# Patient Record
Sex: MALE | Race: BLACK OR AFRICAN AMERICAN | NOT HISPANIC OR LATINO
[De-identification: names, ages, dates, MRNs, and addresses within clinical notes are randomized per-mention and may not be internally consistent; named-entity substitution may affect disease eponyms.]

---

## 2017-01-03 ENCOUNTER — RX ONLY (OUTPATIENT)
Age: 77
Setting detail: RX ONLY
End: 2017-01-03

## 2017-01-09 ENCOUNTER — RX ONLY (OUTPATIENT)
Age: 77
Setting detail: RX ONLY
End: 2017-01-09

## 2017-01-19 ENCOUNTER — RX ONLY (OUTPATIENT)
Age: 77
Setting detail: RX ONLY
End: 2017-01-19

## 2017-01-20 ENCOUNTER — RX ONLY (OUTPATIENT)
Age: 77
Setting detail: RX ONLY
End: 2017-01-20

## 2017-01-23 ENCOUNTER — RX ONLY (OUTPATIENT)
Age: 77
Setting detail: RX ONLY
End: 2017-01-23

## 2017-03-22 ENCOUNTER — RX ONLY (OUTPATIENT)
Age: 77
Setting detail: RX ONLY
End: 2017-03-22

## 2018-08-01 ENCOUNTER — HOSPITAL ENCOUNTER (OUTPATIENT)
Dept: HOSPITAL 5 - NM | Age: 78
Discharge: HOME | End: 2018-08-01
Attending: INTERNAL MEDICINE
Payer: MEDICARE

## 2018-08-01 DIAGNOSIS — I10: ICD-10-CM

## 2018-08-01 DIAGNOSIS — Z91.018: ICD-10-CM

## 2018-08-01 DIAGNOSIS — R06.02: Primary | ICD-10-CM

## 2018-08-01 DIAGNOSIS — Z88.6: ICD-10-CM

## 2018-08-01 DIAGNOSIS — Z88.8: ICD-10-CM

## 2018-08-01 DIAGNOSIS — K21.9: ICD-10-CM

## 2018-08-01 DIAGNOSIS — J44.9: ICD-10-CM

## 2018-08-01 DIAGNOSIS — R79.1: ICD-10-CM

## 2018-08-01 LAB
ALBUMIN SERPL-MCNC: 4.1 G/DL (ref 3.9–5)
ALT SERPL-CCNC: 11 UNITS/L (ref 7–56)
BUN SERPL-MCNC: 21 MG/DL (ref 9–20)
BUN/CREAT SERPL: 16 %
CALCIUM SERPL-MCNC: 9.6 MG/DL (ref 8.4–10.2)
HCT VFR BLD CALC: 35.3 % (ref 35.5–45.6)
HEMOLYSIS INDEX: 6
HGB BLD-MCNC: 11.6 GM/DL (ref 11.8–15.2)
MCH RBC QN AUTO: 29 PG (ref 28–32)
MCHC RBC AUTO-ENTMCNC: 33 % (ref 32–34)
MCV RBC AUTO: 87 FL (ref 84–94)
PLATELET # BLD: 202 K/MM3 (ref 140–440)
RBC # BLD AUTO: 4.05 M/MM3 (ref 3.65–5.03)

## 2018-08-01 PROCEDURE — 71046 X-RAY EXAM CHEST 2 VIEWS: CPT

## 2018-08-01 PROCEDURE — 93970 EXTREMITY STUDY: CPT

## 2018-08-01 PROCEDURE — A9558 XE133 XENON 10MCI: HCPCS

## 2018-08-01 PROCEDURE — 78582 LUNG VENTILAT&PERFUS IMAGING: CPT

## 2018-08-01 PROCEDURE — 85027 COMPLETE CBC AUTOMATED: CPT

## 2018-08-01 PROCEDURE — 36415 COLL VENOUS BLD VENIPUNCTURE: CPT

## 2018-08-01 PROCEDURE — 80053 COMPREHEN METABOLIC PANEL: CPT

## 2018-08-01 PROCEDURE — A9540 TC99M MAA: HCPCS

## 2018-08-01 NOTE — NUCLEAR MEDICINE REPORT
LUNG SCAN, VENTILATION AND PERFUSION:



History: Shortness of breath, abnormal coagulation profile.



Technique: 5mci of Tc99m MAA was infused for the perfusion images.  

15mci  gas was inhaled for the ventilatory images.



Correlation is made with a chest x-ray dated 8/1/18.



Findings:

Inhalation of Xenon gas demonstrates a normal distribution of the

activity throughout both lungs.  The wash out phases show mild 

retention of the radiotracer suggesting mild COPD.



After injection of Technetium 99m macroaggregated albumin gamma

camera imaging of the lungs in multiple projections demonstrates

normal pulmonary contours with a homogeneous distribution of activity. 

No focal areas of perfusion deficiency are identified.



IMPRESSION:

Low probability for pulmonary embolus.

## 2018-08-01 NOTE — XRAY REPORT
CHEST XRAY, 2 VIEWS:



History: Pulmonary embolism.



Findings:



There is mild diffuse interstitial coarsening.  The lungs are 

hyperexpanded but clear.  No infiltrate, pleural fluid or pneumothorax 

is detected.  The cardiac silhouette and pulmonary vasculature are 

within normal limits for technique. The bony thorax is unremarkable.



IMPRESSION:

Changes consistent with mild COPD.  No acute cardiopulmonary process.

## 2018-08-06 NOTE — VASCULAR LAB REPORT
LOWER EXTREMITY VENOUS DUPLEX:



REASON FOR EXAM: Short of breath.



COMMENTS ON THE RIGHT:

All veins visualized are freely compressible without evidence of

internal echogenicity.  Flow is spontaneous and phasic throughout.



COMMENTS ON THE LEFT:

All veins visualized are freely compressible without evidence of

internal echogenicity.  Flow is spontaneous and phasic throughout.



IMPRESSION:

No evidence of acute or chronic deep venous thrombosis in either

lower extremity.

## 2019-06-25 ENCOUNTER — APPOINTMENT (RX ONLY)
Dept: URBAN - METROPOLITAN AREA CLINIC 51 | Facility: CLINIC | Age: 79
Setting detail: DERMATOLOGY
End: 2019-06-25

## 2019-06-25 ENCOUNTER — APPOINTMENT (RX ONLY)
Dept: URBAN - METROPOLITAN AREA CLINIC 52 | Facility: CLINIC | Age: 79
Setting detail: DERMATOLOGY
End: 2019-06-25

## 2019-06-25 DIAGNOSIS — L28.0 LICHEN SIMPLEX CHRONICUS: ICD-10-CM

## 2019-06-25 DIAGNOSIS — B35.6 TINEA CRURIS: ICD-10-CM

## 2019-06-25 DIAGNOSIS — L85.3 XEROSIS CUTIS: ICD-10-CM

## 2019-06-25 PROBLEM — F32.9 MAJOR DEPRESSIVE DISORDER, SINGLE EPISODE, UNSPECIFIED: Status: ACTIVE | Noted: 2019-06-25

## 2019-06-25 PROBLEM — I10 ESSENTIAL (PRIMARY) HYPERTENSION: Status: ACTIVE | Noted: 2019-06-25

## 2019-06-25 PROBLEM — K21.9 GASTRO-ESOPHAGEAL REFLUX DISEASE WITHOUT ESOPHAGITIS: Status: ACTIVE | Noted: 2019-06-25

## 2019-06-25 PROBLEM — J45.909 UNSPECIFIED ASTHMA, UNCOMPLICATED: Status: ACTIVE | Noted: 2019-06-25

## 2019-06-25 PROBLEM — F41.9 ANXIETY DISORDER, UNSPECIFIED: Status: ACTIVE | Noted: 2019-06-25

## 2019-06-25 PROCEDURE — ? PRESCRIPTION

## 2019-06-25 PROCEDURE — ? COUNSELING

## 2019-06-25 PROCEDURE — ? INTRALESIONAL KENALOG

## 2019-06-25 PROCEDURE — 99213 OFFICE O/P EST LOW 20 MIN: CPT | Mod: 25

## 2019-06-25 PROCEDURE — 11901 INJECT SKIN LESIONS >7: CPT

## 2019-06-25 RX ORDER — CLOTRIMAZOLE/BETAMETHASONE DIP 1 %-0.05 %
1 CREAM (GRAM) TOPICAL BID
Qty: 45 | Refills: 1 | Status: ERX | COMMUNITY
Start: 2019-06-25

## 2019-06-25 RX ADMIN — Medication 1: at 20:23

## 2019-06-25 ASSESSMENT — LOCATION DETAILED DESCRIPTION DERM
LOCATION DETAILED: RIGHT INGUINAL CREASE
LOCATION DETAILED: LEFT ANTERIOR PROXIMAL THIGH
LOCATION DETAILED: LEFT BUTTOCK
LOCATION DETAILED: LEFT ANTERIOR PROXIMAL THIGH
LOCATION DETAILED: LEFT BUTTOCK
LOCATION DETAILED: RIGHT BUTTOCK
LOCATION DETAILED: RIGHT INGUINAL CREASE
LOCATION DETAILED: RIGHT BUTTOCK

## 2019-06-25 ASSESSMENT — LOCATION SIMPLE DESCRIPTION DERM
LOCATION SIMPLE: LEFT THIGH
LOCATION SIMPLE: GROIN
LOCATION SIMPLE: RIGHT BUTTOCK
LOCATION SIMPLE: GROIN
LOCATION SIMPLE: LEFT BUTTOCK
LOCATION SIMPLE: LEFT THIGH
LOCATION SIMPLE: LEFT BUTTOCK
LOCATION SIMPLE: RIGHT BUTTOCK

## 2019-06-25 ASSESSMENT — LOCATION ZONE DERM
LOCATION ZONE: TRUNK
LOCATION ZONE: LEG
LOCATION ZONE: LEG
LOCATION ZONE: TRUNK

## 2019-06-25 NOTE — HPI: RASH
How Severe Is Your Rash?: moderate
Is This A New Presentation, Or A Follow-Up?: Follow Up Rash
Additional History: Pt has been given Ketoconazole cream in the past

## 2019-06-25 NOTE — PROCEDURE: INTRALESIONAL KENALOG
Kenalog Preparation: Kenalog
X Size Of Lesion In Cm (Optional): 0
Include Z78.9 (Other Specified Conditions Influencing Health Status) As An Associated Diagnosis?: No
Consent: The risks of atrophy were reviewed with the patient.
Concentration Of Kenalog Solution Injected (Mg/Ml): 5.0
Total Volume (Ccs): 2.5
Medical Necessity Clause: This procedure was medically necessary because the lesions that were treated were:
Detail Level: Detailed

## 2019-06-25 NOTE — PROCEDURE: INTRALESIONAL KENALOG
Consent: The risks of atrophy were reviewed with the patient.
Concentration Of Kenalog Solution Injected (Mg/Ml): 5.0
X Size Of Lesion In Cm (Optional): 0
Include Z78.9 (Other Specified Conditions Influencing Health Status) As An Associated Diagnosis?: No
Total Volume (Ccs): 2.5
Medical Necessity Clause: This procedure was medically necessary because the lesions that were treated were:
Detail Level: Detailed
Kenalog Preparation: Kenalog

## 2019-07-12 ENCOUNTER — HOSPITAL ENCOUNTER (OUTPATIENT)
Dept: HOSPITAL 5 - GIO | Age: 79
Discharge: HOME | End: 2019-07-12
Attending: INTERNAL MEDICINE
Payer: MEDICARE

## 2019-07-12 VITALS — SYSTOLIC BLOOD PRESSURE: 113 MMHG | DIASTOLIC BLOOD PRESSURE: 60 MMHG

## 2019-07-12 DIAGNOSIS — Z91.018: ICD-10-CM

## 2019-07-12 DIAGNOSIS — J44.9: ICD-10-CM

## 2019-07-12 DIAGNOSIS — Z79.899: ICD-10-CM

## 2019-07-12 DIAGNOSIS — I10: ICD-10-CM

## 2019-07-12 DIAGNOSIS — Z88.8: ICD-10-CM

## 2019-07-12 DIAGNOSIS — Z88.6: ICD-10-CM

## 2019-07-12 DIAGNOSIS — Z87.440: ICD-10-CM

## 2019-07-12 DIAGNOSIS — K29.70: ICD-10-CM

## 2019-07-12 DIAGNOSIS — Z87.442: ICD-10-CM

## 2019-07-12 DIAGNOSIS — K21.0: Primary | ICD-10-CM

## 2019-07-12 DIAGNOSIS — K22.8: ICD-10-CM

## 2019-07-12 DIAGNOSIS — Z86.010: ICD-10-CM

## 2019-07-12 PROCEDURE — 88305 TISSUE EXAM BY PATHOLOGIST: CPT

## 2019-07-12 PROCEDURE — 88342 IMHCHEM/IMCYTCHM 1ST ANTB: CPT

## 2019-07-12 PROCEDURE — 43239 EGD BIOPSY SINGLE/MULTIPLE: CPT

## 2019-07-12 PROCEDURE — 88312 SPECIAL STAINS GROUP 1: CPT

## 2019-07-12 NOTE — ANESTHESIA CONSULTATION
Anesthesia Consult and Med Hx


Date of service: 07/12/19





- Airway


Anesthetic Teeth Evaluation: Good, Dentures


ROM Head & Neck: Adequate


Mental/Hyoid Distance: Adequate


Mallampati Class: Class II


Intubation Access Assessment: Probably Good





- Pre-Operative Health Status


ASA Pre-Surgery Classification: ASA2


Proposed Anesthetic Plan: MAC





- Pulmonary


COPD: Yes





- Cardiovascular System


Hx Hypertension: Yes





- Gastrointestinal


Hx Gastroesophageal Reflux Disease: Yes (Erosive esophagitis)





- Endocrine


Hx Renal Disease: Yes

## 2019-07-12 NOTE — PROCEDURE NOTE
Date of procedure: 07/12/19


Pre-op diagnosis: GERD


Post-op diagnosis: other (Moderately,Severe Erosive Esophagitis/ Mild to 

Moderate Candida Esophagitis/Gastritis)


Anesthesia: MAC


Surgeon: LORA WALLIS


Estimated blood loss: minimal


Pathology: list


Specimen disposition: to lab


Condition: stable


Disposition: same day (Treat with PPI if ok with the patient's nephrologist.  

Avoid aspirin and NSAID and anticoagulants for 5 days and follow up in 1 to 2 

weeks ([phone]).)

## 2019-07-12 NOTE — OPERATIVE REPORT
PROCEDURE PERFORMED:  Esophagogastroduodenoscopy with biopsy.



INDICATIONS:  A 79-year-old -American gentleman with a prior history of

colon polyps and diverticular disease.  Last colonoscopy was done in 2016. 

Lately, he has been having problems with GERD symptoms.  EGD was done to assess

for erosive esophagitis.  He has a history of renal insufficiency and is unable

to take PPIs.



DESCRIPTION OF PROCEDURE:  The procedure was done at [facility] after getting informed consent with MAC anesthesia.  Instrument was

passed through the hypopharynx into the esophagus, which did show presence of

moderate distal erosive esophagitis with possible Krause's esophagus as well as

moderate Candida esophagitis involving the mid esophagus.  Biopsy was done from

the mid esophagus as well as from the distal esophagus to assess for the

severity and presence of Candida esophagitis and the severity of the erosive

esophagitis.  Stomach showed gastritis.  Biopsy was done from the gastric body,

angle incisura and the gastric antrum to rule out for H. pylori and atrophic

gastritis.  The pylorus is patent.  The duodenum in the first and second portion

appeared normal.



ASSESSMENT:  Gastroesophageal reflux disease symptoms, moderate erosive

esophagitis, rule out Krause's esophagus, mild to moderate Candida esophagitis,

gastritis.



PLAN:  To treat the patient with fluconazole if the patient's renal doctor

permits, also to place the patient on PPI and avoid aspirin and aspirin-related

products for the next few days.  If the patient is positive for H. pylori, the

patient will be appropriately treated.  The patient will be asked to follow up

in the office in 1-2 weeks' time.



The procedure was done in the GI lab in the presence and with the assistance of

RN, Shala James and Olga Lidia gaspar and also with the assistance of anesthesia.





DD: 07/12/2019 13:45

DT: 07/12/2019 14:02

JOB# [id number]  [id number]

FELIZ/BRONWYN

## 2019-07-16 ENCOUNTER — APPOINTMENT (RX ONLY)
Dept: URBAN - METROPOLITAN AREA CLINIC 52 | Facility: CLINIC | Age: 79
Setting detail: DERMATOLOGY
End: 2019-07-16

## 2019-07-16 ENCOUNTER — APPOINTMENT (RX ONLY)
Dept: URBAN - METROPOLITAN AREA CLINIC 51 | Facility: CLINIC | Age: 79
Setting detail: DERMATOLOGY
End: 2019-07-16

## 2019-07-16 DIAGNOSIS — L85.3 XEROSIS CUTIS: ICD-10-CM | Status: IMPROVED

## 2019-07-16 DIAGNOSIS — B35.6 TINEA CRURIS: ICD-10-CM | Status: IMPROVED

## 2019-07-16 DIAGNOSIS — L28.0 LICHEN SIMPLEX CHRONICUS: ICD-10-CM | Status: IMPROVED

## 2019-07-16 PROBLEM — M12.9 ARTHROPATHY, UNSPECIFIED: Status: ACTIVE | Noted: 2019-07-16

## 2019-07-16 PROBLEM — F41.9 ANXIETY DISORDER, UNSPECIFIED: Status: ACTIVE | Noted: 2019-07-16

## 2019-07-16 PROBLEM — H91.90 UNSPECIFIED HEARING LOSS, UNSPECIFIED EAR: Status: ACTIVE | Noted: 2019-07-16

## 2019-07-16 PROBLEM — F32.9 MAJOR DEPRESSIVE DISORDER, SINGLE EPISODE, UNSPECIFIED: Status: ACTIVE | Noted: 2019-07-16

## 2019-07-16 PROBLEM — I10 ESSENTIAL (PRIMARY) HYPERTENSION: Status: ACTIVE | Noted: 2019-07-16

## 2019-07-16 PROBLEM — K21.9 GASTRO-ESOPHAGEAL REFLUX DISEASE WITHOUT ESOPHAGITIS: Status: ACTIVE | Noted: 2019-07-16

## 2019-07-16 PROBLEM — J44.9 CHRONIC OBSTRUCTIVE PULMONARY DISEASE, UNSPECIFIED: Status: ACTIVE | Noted: 2019-07-16

## 2019-07-16 PROCEDURE — ? COUNSELING

## 2019-07-16 PROCEDURE — ? PRESCRIPTION MEDICATION MANAGEMENT

## 2019-07-16 PROCEDURE — 99213 OFFICE O/P EST LOW 20 MIN: CPT

## 2019-07-16 ASSESSMENT — LOCATION DETAILED DESCRIPTION DERM
LOCATION DETAILED: RIGHT BUTTOCK
LOCATION DETAILED: RIGHT INGUINAL CREASE
LOCATION DETAILED: LEFT ANTERIOR PROXIMAL THIGH
LOCATION DETAILED: LEFT ANTERIOR PROXIMAL THIGH
LOCATION DETAILED: RIGHT BUTTOCK
LOCATION DETAILED: RIGHT INGUINAL CREASE
LOCATION DETAILED: LEFT BUTTOCK
LOCATION DETAILED: LEFT BUTTOCK

## 2019-07-16 ASSESSMENT — LOCATION SIMPLE DESCRIPTION DERM
LOCATION SIMPLE: LEFT BUTTOCK
LOCATION SIMPLE: RIGHT BUTTOCK
LOCATION SIMPLE: RIGHT BUTTOCK
LOCATION SIMPLE: LEFT BUTTOCK
LOCATION SIMPLE: LEFT THIGH
LOCATION SIMPLE: GROIN
LOCATION SIMPLE: LEFT THIGH
LOCATION SIMPLE: GROIN

## 2019-07-16 ASSESSMENT — LOCATION ZONE DERM
LOCATION ZONE: TRUNK
LOCATION ZONE: LEG
LOCATION ZONE: TRUNK
LOCATION ZONE: LEG

## 2019-07-16 NOTE — PROCEDURE: PRESCRIPTION MEDICATION MANAGEMENT
Detail Level: Zone
Otc Regimen: Zeasorb powder
Continue Regimen: Lotrisone
Render In Strict Bullet Format?: No

## 2019-09-26 RX ORDER — CLOTRIMAZOLE/BETAMETHASONE DIP 1 %-0.05 %
CREAM (GRAM) TOPICAL BID
Qty: 45 | Refills: 0 | Status: ERX

## 2022-02-25 ENCOUNTER — HOSPITAL ENCOUNTER (OUTPATIENT)
Dept: HOSPITAL 5 - GIO | Age: 82
Discharge: HOME | End: 2022-02-25
Attending: INTERNAL MEDICINE
Payer: MEDICARE

## 2022-02-25 VITALS — SYSTOLIC BLOOD PRESSURE: 117 MMHG | DIASTOLIC BLOOD PRESSURE: 57 MMHG

## 2022-02-25 DIAGNOSIS — K21.00: ICD-10-CM

## 2022-02-25 DIAGNOSIS — I10: ICD-10-CM

## 2022-02-25 DIAGNOSIS — K44.9: ICD-10-CM

## 2022-02-25 DIAGNOSIS — K29.70: ICD-10-CM

## 2022-02-25 DIAGNOSIS — R13.10: Primary | ICD-10-CM

## 2022-02-25 DIAGNOSIS — K31.89: ICD-10-CM

## 2022-02-25 DIAGNOSIS — K22.2: ICD-10-CM

## 2022-02-25 DIAGNOSIS — J44.9: ICD-10-CM

## 2022-02-25 DIAGNOSIS — Z87.891: ICD-10-CM

## 2022-02-25 DIAGNOSIS — Z87.442: ICD-10-CM

## 2022-02-25 PROCEDURE — 88342 IMHCHEM/IMCYTCHM 1ST ANTB: CPT

## 2022-02-25 PROCEDURE — 43239 EGD BIOPSY SINGLE/MULTIPLE: CPT

## 2022-02-25 PROCEDURE — 43249 ESOPH EGD DILATION <30 MM: CPT

## 2022-02-25 PROCEDURE — 88305 TISSUE EXAM BY PATHOLOGIST: CPT

## 2022-02-25 PROCEDURE — C1726 CATH, BAL DIL, NON-VASCULAR: HCPCS

## 2022-02-25 NOTE — ANESTHESIA CONSULTATION
Anesthesia Consult and Med Hx


Date of service: 02/25/22





- Airway


Anesthetic Teeth Evaluation: Dentures


ROM Head & Neck: Adequate


Mental/Hyoid Distance: Adequate


Mallampati Class: Class I


Intubation Access Assessment: Good





- Pulmonary Exam


CTA: Yes





- Cardiac Exam


Cardiac Exam: RRR





- Pre-Operative Health Status


ASA Pre-Surgery Classification: ASA3


Proposed Anesthetic Plan: MAC





- Pulmonary


Hx Smoking: Yes (States that he quit)


COPD: Yes





- Cardiovascular System


Hx Hypertension: Yes (neg stress test 2 months ago)





- Central Nervous System


Hx Psychiatric Problems: Yes (PTSD)





- Gastrointestinal


Hx Gastroesophageal Reflux Disease: Yes (Erosive esophagitis)





- Endocrine


Hx Renal Disease: Yes (stage III)





- Hematic


Hx Anemia: No


Hx Sickle Cell Disease: No





- Other Systems


Hx Alcohol Use: No


Hx Substance Use: No


Hx Cancer: No


Hx Obesity: No





- Additional Comments


Anesthesia Medical History Comments: No hx of anesthetic complications

## 2022-02-25 NOTE — PROCEDURE NOTE
Date of procedure: 02/25/22


Pre-op diagnosis: Dysphagia/ GERD


Post-op diagnosis: other (Mild, Benign Esophageal Stenosis (s/p Balloon 

dilation)/ Moderate, Erosive Esophagitis/ R/O Eiosinophilic Esophagitis/ 

Moderate, Hialtal Hernia/ Gastritis/ No Peptic Ulcer Disease noted)


Procedure: 





EGD with Biopsy and Esophageal Dilation with a 20 mm Balloon


Anesthesia: MAC


Surgeon: LORA WALLIS


Estimated blood loss: minimal


Pathology: list


Specimen disposition: to lab


Condition: stable


Disposition: same day (Treat with PPI, avoid aspirin and NSAID for 5 days. F/U 

in 1 to 2 weeks ([phone]).)

## 2022-02-25 NOTE — OPERATIVE REPORT
DATE OF SURGERY: 02/25/2022



INDICATIONS:  This is an 82-year-old -American gentleman with an 

underlying history of renal insufficiency, who lately has been having GERD 

symptoms and dysphagia.  EGD was done to further assess his upper GI pathology 

and to do an esophageal dilation if required.



DESCRIPTION OF PROCEDURE:  Procedure was done after getting informed consent 

with MAC anesthesia.  The instrument was passed through the hypopharynx into the

esophagus, which did show mild benign esophageal stenosis.  This was dilated at 

the end of the procedure with a 20 mm balloon that was maintained for a minute. 

There was moderate erosive esophagitis noted in the distal esophagus, which was 

also photodocumentation was done and biopsy was also done to assess for the 

severity of the erosive esophagitis.  Additional biopsy was done from the mid 

esophagus to rule out for eosinophilic esophagitis.  Stomach showed moderate 

hiatal hernia on the retroverted view as well as gastritis.  Biopsy was done 

from the gastric antrum, gastric body and angular incisura to rule out for H. 

pylori and atrophic gastritis.  The pylorus was patent.  The duodenum in the 

first and second portion appeared normal.



ASSESSMENT:  Dysphagia, gastroesophageal reflux disease symptoms, mild benign 

esophageal stenosis, status post balloon dilation with a 20 mm balloon 

maintained for a minute, moderate erosive esophagitis, rule out eosinophilic 

esophagitis, moderate hiatal hernia, gastritis.



PLAN:  To treat the patient with PPI, have the patient avoid aspirin and 

aspirin-related products for the next few days and otherwise resume home 

medications.  Follow up in the office in 1-2 weeks' time.  Procedure was done in

the GI lab with assistance of the GI lab team, which included the GI nurse, the 

GI tech and with assistance of Anesthesia.







DD: 02/25/2022 11:37 AM

DT: 02/25/2022 12:17 PM

TID: [id number] RECEIPT: [id number]

FELIZ/MONTANA

## 2023-06-01 ENCOUNTER — OFFICE VISIT (OUTPATIENT)
Dept: URBAN - METROPOLITAN AREA CLINIC 118 | Facility: CLINIC | Age: 83
End: 2023-06-01
Payer: MEDICARE

## 2023-06-01 ENCOUNTER — LAB OUTSIDE AN ENCOUNTER (OUTPATIENT)
Dept: URBAN - METROPOLITAN AREA CLINIC 118 | Facility: CLINIC | Age: 83
End: 2023-06-01

## 2023-06-01 VITALS
TEMPERATURE: 97.5 F | HEIGHT: 71 IN | HEART RATE: 79 BPM | DIASTOLIC BLOOD PRESSURE: 72 MMHG | WEIGHT: 193.6 LBS | BODY MASS INDEX: 27.1 KG/M2 | SYSTOLIC BLOOD PRESSURE: 121 MMHG

## 2023-06-01 DIAGNOSIS — A04.8 H. PYLORI INFECTION: ICD-10-CM

## 2023-06-01 DIAGNOSIS — K21.9 ACID REFLUX: ICD-10-CM

## 2023-06-01 DIAGNOSIS — K31.A0 INTESTINAL METAPLASIA OF GASTRIC MUCOSA: ICD-10-CM

## 2023-06-01 PROCEDURE — 99204 OFFICE O/P NEW MOD 45 MIN: CPT | Performed by: INTERNAL MEDICINE

## 2023-06-01 RX ORDER — FENOFIBRATE 48 MG/1
TABLET, FILM COATED ORAL
Qty: 90 TABLET | Status: ACTIVE | COMMUNITY

## 2023-06-01 RX ORDER — METOPROLOL TARTRATE 50 MG/1
TABLET, FILM COATED ORAL
Qty: 180 TABLET | Status: ACTIVE | COMMUNITY

## 2023-06-01 RX ORDER — ROSUVASTATIN CALCIUM 20 MG/1
TABLET, FILM COATED ORAL
Qty: 90 TABLET | Status: ACTIVE | COMMUNITY

## 2023-06-01 RX ORDER — AMLODIPINE BESYLATE 5 MG/1
TABLET ORAL
Qty: 90 TABLET | Status: ACTIVE | COMMUNITY

## 2023-06-01 RX ORDER — LISINOPRIL 20 MG/1
TABLET ORAL
Qty: 90 TABLET | Status: ACTIVE | COMMUNITY

## 2023-06-01 RX ORDER — FLUTICASONE PROPIONATE 50 UG/1
SPRAY, METERED NASAL
Qty: 32 | Status: ACTIVE | COMMUNITY

## 2023-06-01 RX ORDER — BACLOFEN 10 MG/1
TABLET ORAL
Qty: 90 TABLET | Status: ACTIVE | COMMUNITY

## 2023-06-01 RX ORDER — PANTOPRAZOLE SODIUM 40 MG/1
TAKE 1 TABLET BY MOUTH EVERY DAY 30 MINUTES BEFORE BREAKFAST TABLET, DELAYED RELEASE ORAL
Qty: 90 EACH | Refills: 1 | Status: ACTIVE | COMMUNITY

## 2023-06-01 RX ORDER — LORATADINE 10 MG
1 TABLET TABLET ORAL ONCE A DAY
Status: ACTIVE | COMMUNITY

## 2023-06-01 RX ORDER — CLORAZEPATE DIPOTASSIUM 3.75 MG/1
TABLET ORAL
Qty: 120 TABLET | Status: ACTIVE | COMMUNITY

## 2023-06-01 RX ORDER — FAMOTIDINE 20 MG/1
TABLET, FILM COATED ORAL
Qty: 90 TABLET | Status: ACTIVE | COMMUNITY

## 2023-06-01 RX ORDER — TAMSULOSIN HYDROCHLORIDE 0.4 MG/1
TAKE 1 CAPSULE BY MOUTH EVERY DAY 30 MINUTES AFTER THE SAME MEAL CAPSULE ORAL
Qty: 90 EACH | Refills: 4 | Status: ACTIVE | COMMUNITY

## 2023-06-01 RX ORDER — IPRATROPIUM BROMIDE AND ALBUTEROL 20; 100 UG/1; UG/1
SPRAY, METERED RESPIRATORY (INHALATION)
Qty: 12 UNSPECIFIED | Status: ACTIVE | COMMUNITY

## 2023-06-01 RX ORDER — ASPIRIN 81 MG/1
1 TABLET TABLET, COATED ORAL ONCE A DAY
Status: ACTIVE | COMMUNITY

## 2023-06-01 NOTE — HPI-TODAY'S VISIT:
The patient  is transferring care to our clinic for reflux disease and dyspepsia.  Note was sent to his primary care.  He has previously seen  for acid reflux Pepcid,.  Upper endoscopy 2016 focal intestinal metaplasia with H. pylori.  He does not recall ever being treated.  He has had no recent nausea, vomiting, abdominal pain, abnormal loss of weight, hematemesis, or melena.  He does take baclofen, Protonix, and famotidine and if he misses these he has severe flares of his reflux.  He is not a smoker.  He denies dysphagia or odynophagia.

## 2023-06-02 PROBLEM — 72519002: Status: ACTIVE | Noted: 2023-06-02

## 2023-06-03 ENCOUNTER — DASHBOARD ENCOUNTERS (OUTPATIENT)
Age: 83
End: 2023-06-03

## 2023-06-03 PROBLEM — 266433003: Status: ACTIVE | Noted: 2023-06-03

## 2023-07-17 ENCOUNTER — OFFICE VISIT (OUTPATIENT)
Dept: URBAN - METROPOLITAN AREA SURGERY CENTER 23 | Facility: SURGERY CENTER | Age: 83
End: 2023-07-17

## 2023-07-17 ENCOUNTER — TELEPHONE ENCOUNTER (OUTPATIENT)
Dept: URBAN - METROPOLITAN AREA CLINIC 118 | Facility: CLINIC | Age: 83
End: 2023-07-17

## 2023-07-17 RX ORDER — BACLOFEN 10 MG/1
1 TABLET TABLET ORAL ONCE A DAY
Qty: 90 TABLET | Refills: 3 | OUTPATIENT
Start: 2023-07-18 | End: 2024-07-12

## 2023-11-28 ENCOUNTER — TELEPHONE ENCOUNTER (OUTPATIENT)
Dept: URBAN - METROPOLITAN AREA CLINIC 118 | Facility: CLINIC | Age: 83
End: 2023-11-28

## 2023-11-28 RX ORDER — FAMOTIDINE 20 MG/1
1 TABLET AT BEDTIME TABLET, FILM COATED ORAL ONCE A DAY
Qty: 90 TABLET | Refills: 3

## 2024-01-17 ENCOUNTER — TELEPHONE ENCOUNTER (OUTPATIENT)
Dept: URBAN - METROPOLITAN AREA CLINIC 118 | Facility: CLINIC | Age: 84
End: 2024-01-17

## 2024-01-17 RX ORDER — PANTOPRAZOLE SODIUM 40 MG/1
1 TABLET TABLET, DELAYED RELEASE ORAL ONCE A DAY
Qty: 90 TABLET | Refills: 3

## 2024-01-25 ENCOUNTER — TELEPHONE ENCOUNTER (OUTPATIENT)
Dept: URBAN - METROPOLITAN AREA CLINIC 118 | Facility: CLINIC | Age: 84
End: 2024-01-25

## 2025-02-03 ENCOUNTER — TELEPHONE ENCOUNTER (OUTPATIENT)
Dept: URBAN - METROPOLITAN AREA CLINIC 118 | Facility: CLINIC | Age: 85
End: 2025-02-03

## 2025-02-03 RX ORDER — BACLOFEN 10 MG/1
1 TABLET TABLET ORAL ONCE A DAY
Qty: 30 | Refills: 11
Start: 2023-07-18 | End: 2026-01-29

## 2025-03-11 ENCOUNTER — OFFICE VISIT (OUTPATIENT)
Dept: URBAN - METROPOLITAN AREA CLINIC 118 | Facility: CLINIC | Age: 85
End: 2025-03-11
Payer: MEDICARE

## 2025-03-11 VITALS
HEIGHT: 71 IN | SYSTOLIC BLOOD PRESSURE: 143 MMHG | DIASTOLIC BLOOD PRESSURE: 64 MMHG | BODY MASS INDEX: 26.29 KG/M2 | HEART RATE: 77 BPM | WEIGHT: 187.8 LBS | TEMPERATURE: 97.5 F

## 2025-03-11 DIAGNOSIS — K21.00 GASTROESOPHAGEAL REFLUX DISEASE WITH ESOPHAGITIS WITHOUT HEMORRHAGE: ICD-10-CM

## 2025-03-11 DIAGNOSIS — Z86.19 HISTORY OF HELICOBACTER PYLORI INFECTION: ICD-10-CM

## 2025-03-11 DIAGNOSIS — K31.A0 INTESTINAL METAPLASIA OF GASTRIC MUCOSA: ICD-10-CM

## 2025-03-11 PROCEDURE — 99213 OFFICE O/P EST LOW 20 MIN: CPT | Performed by: INTERNAL MEDICINE

## 2025-03-11 RX ORDER — FLUTICASONE PROPIONATE AND SALMETEROL 50; 250 UG/1; UG/1
1 PUFF POWDER RESPIRATORY (INHALATION) TWICE A DAY
Status: ACTIVE | COMMUNITY

## 2025-03-11 RX ORDER — ROSUVASTATIN CALCIUM 20 MG/1
TABLET, FILM COATED ORAL
Qty: 90 TABLET | Status: ACTIVE | COMMUNITY

## 2025-03-11 RX ORDER — BACLOFEN 10 MG/1
TABLET ORAL
Qty: 90 TABLET | Status: ACTIVE | COMMUNITY

## 2025-03-11 RX ORDER — LORATADINE 10 MG
1 TABLET TABLET ORAL ONCE A DAY
Status: ACTIVE | COMMUNITY

## 2025-03-11 RX ORDER — IPRATROPIUM BROMIDE AND ALBUTEROL 20; 100 UG/1; UG/1
SPRAY, METERED RESPIRATORY (INHALATION)
Qty: 12 UNSPECIFIED | Status: ACTIVE | COMMUNITY

## 2025-03-11 RX ORDER — CLORAZEPATE DIPOTASSIUM 3.75 MG/1
TABLET ORAL
Qty: 120 TABLET | Status: ACTIVE | COMMUNITY

## 2025-03-11 RX ORDER — METOPROLOL TARTRATE 50 MG/1
1 TABLET WITH FOOD TABLET ORAL TWICE A DAY
Status: ACTIVE | COMMUNITY

## 2025-03-11 RX ORDER — ASPIRIN 81 MG/1
1 TABLET TABLET, COATED ORAL ONCE A DAY
Status: ACTIVE | COMMUNITY

## 2025-03-11 RX ORDER — PANTOPRAZOLE SODIUM 40 MG/1
1 TABLET TABLET, DELAYED RELEASE ORAL ONCE A DAY
Qty: 90 TABLET | Refills: 3

## 2025-03-11 RX ORDER — PANTOPRAZOLE SODIUM 40 MG/1
1 TABLET TABLET, DELAYED RELEASE ORAL ONCE A DAY
Qty: 90 TABLET | Refills: 3 | Status: ACTIVE | COMMUNITY

## 2025-03-11 RX ORDER — FLUTICASONE PROPIONATE 50 UG/1
SPRAY, METERED NASAL
Qty: 32 | Status: ACTIVE | COMMUNITY

## 2025-03-11 RX ORDER — MEMANTINE HYDROCHLORIDE 10 MG/1
1 TABLET TABLET ORAL ONCE A DAY
Status: ACTIVE | COMMUNITY

## 2025-03-11 RX ORDER — LISINOPRIL 20 MG/1
TABLET ORAL
Qty: 90 TABLET | Status: ACTIVE | COMMUNITY

## 2025-03-11 RX ORDER — AMLODIPINE BESYLATE 5 MG/1
TABLET ORAL
Qty: 90 TABLET | Status: ACTIVE | COMMUNITY

## 2025-03-11 RX ORDER — FENOFIBRATE 48 MG/1
1 TABLET TABLET ORAL ONCE A DAY
Status: ACTIVE | COMMUNITY

## 2025-03-11 RX ORDER — FAMOTIDINE 20 MG/1
1 TABLET AT BEDTIME TABLET, FILM COATED ORAL ONCE A DAY
Qty: 90 TABLET | Refills: 3

## 2025-03-11 RX ORDER — FAMOTIDINE 20 MG/1
1 TABLET AT BEDTIME TABLET, FILM COATED ORAL ONCE A DAY
Qty: 90 TABLET | Refills: 3 | Status: ACTIVE | COMMUNITY

## 2025-03-11 RX ORDER — BACLOFEN 10 MG/1
1 TABLET TABLET ORAL ONCE A DAY
Qty: 90 TABLET | Refills: 3

## 2025-03-11 NOTE — PHYSICAL EXAM HENT:
Head, normocephalic, atraumatic, Face, Face within normal limits, Ears, External ears within normal limits, Nose/Nasopharynx, External nose normal appearance, nares patent, no nasal discharge, Mouth and Throat, Oral cavity appearance normal, Lips, Appearance normal None

## 2025-03-11 NOTE — HPI-TODAY'S VISIT:
The patient is following up after an office visit in 2023.  He is chronic reflux disease/gastritis and improved and he is only taking Protonix 3 times a week; he also is taking famotidine and baclofen daily.  Despite the decreased dose of Protonix his symptoms have not flared.  There is no dysphagia, hematemesis, weight loss, or melena.  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  He does not want an upper endoscopy unless his symptoms progress

## 2025-03-14 ENCOUNTER — P2P PATIENT RECORD (OUTPATIENT)
Age: 85
End: 2025-03-14

## 2025-04-07 ENCOUNTER — ERX REFILL RESPONSE (OUTPATIENT)
Dept: URBAN - METROPOLITAN AREA CLINIC 118 | Facility: CLINIC | Age: 85
End: 2025-04-07

## 2025-04-07 RX ORDER — FAMOTIDINE 20 MG/1
TAKE 1 TABLET BY MOUTH EVERY DAY AT BEDTIME TABLET, FILM COATED ORAL
Qty: 90 TABLET | Refills: 3